# Patient Record
Sex: FEMALE | Race: WHITE | NOT HISPANIC OR LATINO | URBAN - METROPOLITAN AREA
[De-identification: names, ages, dates, MRNs, and addresses within clinical notes are randomized per-mention and may not be internally consistent; named-entity substitution may affect disease eponyms.]

---

## 2017-05-16 ENCOUNTER — OFFICE VISIT (OUTPATIENT)
Dept: NEUROLOGY | Facility: CLINIC | Age: 52
End: 2017-05-16

## 2017-05-16 VITALS — SYSTOLIC BLOOD PRESSURE: 124 MMHG | HEART RATE: 70 BPM | RESPIRATION RATE: 18 BRPM | DIASTOLIC BLOOD PRESSURE: 70 MMHG

## 2017-05-16 DIAGNOSIS — G35 MULTIPLE SCLEROSIS (H): Primary | ICD-10-CM

## 2017-05-16 DIAGNOSIS — R39.15 URINARY URGENCY: ICD-10-CM

## 2017-05-16 DIAGNOSIS — R26.9 GAIT DISTURBANCE: ICD-10-CM

## 2017-05-16 RX ORDER — OXYBUTYNIN CHLORIDE 5 MG/1
5 TABLET ORAL 2 TIMES DAILY
Qty: 60 TABLET | Refills: 5 | Status: SHIPPED | OUTPATIENT
Start: 2017-05-16

## 2017-05-16 ASSESSMENT — PAIN SCALES - GENERAL: PAINLEVEL: EXTREME PAIN (9)

## 2017-05-16 NOTE — MR AVS SNAPSHOT
After Visit Summary   5/16/2017    Lily Graham    MRN: [id number]           Patient Information     Date Of Birth          1965        Visit Information        Provider Department      5/16/2017 2:00 PM Darien Mota MD [facility]        Today's Diagnoses     Urinary urgency    -  1    Gait disturbance        Multiple sclerosis (H)          Care Instructions    1. We will try oxybutynin 5 mg twice daily for urinary urgency    2. We have placed a referral to physical therapy for evaluation of gait problems    3. We will plan on you returning to the [facility] ([address] [place] [address]) for follow up appointment in 6 months: we will contact you to arrange, but you can also call [phone]         Follow-ups after your visit        Additional Services     PHYSICAL THERAPY REFERRAL       *This therapy referral will be filtered to a centralized scheduling office at [facility] and the patient will receive a call to schedule an appointment at a [place] location most convenient for them. *     [facility] provides Physical Therapy evaluation and treatment and many specialty services across the [place] system.  If requesting a specialty program, please choose from the list below.    If you have not heard from the scheduling office within 2 business days, please call [phone] for all locations, with the exception of [place], please call [phone].  Treatment: Evaluation & Treatment  Special Instructions/Modalities:   Special Programs: None    Please be aware that coverage of these services is subject to the terms and limitations of your health insurance plan.  Call member services at your health plan with any benefit or coverage questions.      **Note to Provider:  If you are referring outside of [place] for the therapy appointment, please list the name of the location in the   special instructions  above, print the referral and give to the patient to schedule the appointment.                  Who to contact     Please call your clinic at [phone] to:    Ask questions about your health    Make or cancel appointments    Discuss your medicines    Learn about your test results    Speak to your doctor   If you have compliments or concerns about an experience at your clinic, or if you wish to file a complaint, please contact [facility] Patient Relations at [phone] or email us at [email or web address]         Additional Information About Your Visit        Bottlenose Information     Bottlenose is an electronic gateway that provides easy, online access to your medical records. With Bottlenose, you can request a clinic appointment, read your test results, renew a prescription or communicate with your care team.     To sign up for [employer] visit the website at [email or web address]   You will be asked to enter the access code listed below, as well as some personal information. Please follow the directions to create your username and password.     Your access code is: [email or web address]  Expires: 2017  3:15 PM     Your access code will  in 90 days. If you need help or a new code, please contact your [facility] or call [phone] for assistance.        Care EveryWhere ID     This is your Care EveryWhere ID. This could be used by other organizations to access your [facility] medical records  [id number]        Your Vitals Were     Pulse Respirations                70 18           Blood Pressure from Last 3 Encounters:   17 124/70   11/15/16 105/58   10/18/16 106/76    Weight from Last 3 Encounters:   No data found for Wt              We Performed the Following     PHYSICAL THERAPY REFERRAL          Today's Medication Changes          These changes are accurate as of: 17  3:15 PM.  If you have any questions, ask  your nurse or doctor.               Start taking these medicines.        Dose/Directions    oxybutynin 5 MG tablet   Commonly known as:  DITROPAN   Used for:  Urinary urgency   Started by:  Darien Mota MD        Dose:  5 mg   Take 1 tablet (5 mg) by mouth 2 times daily   Quantity:  60 tablet   Refills:  5            Where to get your medicines      These medications were sent to [facility] [address] [address]  [address] [facility] [address]     Phone:  [phone]     oxybutynin 5 MG tablet                Primary Care Provider Office Phone # Fax #    Graham Bermudez [phone]       [address] [address]        Thank you!     Thank you for choosing [facility]  for your care. Our goal is always to provide you with excellent care. Hearing back from our patients is one way we can continue to improve our services. Please take a few minutes to complete the written survey that you may receive in the mail after your visit with us. Thank you!             Your Updated Medication List - Protect others around you: Learn how to safely use, store and throw away your medicines at www.disposemymeds.org.          This list is accurate as of: 5/16/17  3:15 PM.  Always use your most recent med list.                   Brand Name Dispense Instructions for use    ADDERALL 20 MG per tablet   Generic drug:  amphetamine-dextroamphetamine      Take 20 mg by mouth 2 times daily       * ALPRAZOLAM XR 2 MG 24 hr tablet   Generic drug:  ALPRAZolam      Take 2 mg by mouth daily       * ALPRAZolam 0.5 MG tablet    XANAX     Take 0.5 mg by mouth 2 times daily       cyclobenzaprine 10 MG tablet    FLEXERIL     Take 10 mg by mouth 3 times daily as needed       * D 5000 5000 UNITS Tabs   Generic drug:  Cholecalciferol      Take 5,000 Units by mouth daily        * cholecalciferol 5000 UNITS Caps capsule    vitamin D3    30 capsule    Take 1 capsule (5,000 Units) by mouth daily       escitalopram 20 MG tablet    LEXAPRO     Take 20 mg by mouth daily       furosemide 20 MG tablet    LASIX     Take 20 mg by mouth as needed       HYDROcodone-acetaminophen  MG per tablet    NORCO     Take 2 tablets by mouth every 4 hours as needed       Morphine Sulfate 60 MG Cp24      Take 60 mg by mouth every 8 hours       oxybutynin 5 MG tablet    DITROPAN    60 tablet    Take 1 tablet (5 mg) by mouth 2 times daily       ranitidine 150 MG tablet    ZANTAC     Take 150 mg by mouth as needed       warfarin 6 MG tablet    COUMADIN     Take 6 mg by mouth daily Except M & F 3 mg       * Notice:  This list has 4 medication(s) that are the same as other medications prescribed for you. Read the directions carefully, and ask your doctor or other care provider to review them with you.

## 2017-05-16 NOTE — PATIENT INSTRUCTIONS
1. We will try oxybutynin 5 mg twice daily for urinary urgency    2. We have placed a referral to physical therapy for evaluation of gait problems    3. We will plan [facility] ([address] [place] [address]) for follow up appointment in 6 months: we will contact you to arrange, but you can also call [phone]

## 2017-05-16 NOTE — LETTER
"5/16/2017      RE: Lily Graham  [address] [address]       Referral source: Established patient     Chief complaint: Multiple sclerosis     History of the Present Illness: Ms. Lily Graham is a 51-year-old woman who returns to the [facility]clerosis Clinic today for scheduled follow-up.  Her history is as per my previous notes.  She has a longstanding history of multiple sclerosis dating back to the early 1990s, but has been off of disease-modifying therapy for greater than 10 years.  MRI imaging performed late last year demonstrated minimal change with accumulation of only a single new lesion in comparison to earlier MRI performed more than 5 years ago.  I discussed with the patient at that time that I did not see a compelling reason for her to initiate disease modifying therapy.      Today, she tells me that \"everything is getting worse and worse.\"  By this, she means that her several chronic issues with back pain, walking difficulty and urinary symptoms are continuing to progress but she has not had any new episodic changes in vision, balance, strength or sensation suggestive of new relapse of MS since she was last seen.      She indicates that she is not able to walk for as long before she has to stop to rest.  Her legs feel subjectively weak.  She did not proceed with physical therapy evaluation after her last appointment as recommended.  She says that no one ever contacted her in this regard.      She also has frequent urinary urgency and urge-associated urinary incontinence.  She tells me that she is going through multiple pads per day because of this.  She has really never had an adequate trial of any anticholinergic medication or other attempt at therapy for urge-associated urinary incontinence.      Past Medical History:  1.  Chronic pain syndrome.   2.  Recurrent deep venous thrombosis, on warfarin anticoagulation.   3.  Depression.   4.  Excessive daytime sleepiness.   5. "  Anxiety and panic symptoms.   6.  Gastroesophageal reflux disease.        PHYSICAL EXAMINATION:   VITAL SIGNS:  Blood pressure 124/70; pulse 70; respiratory rate 18 breaths per minute.   GENERAL:  Well-nourished woman who presents to the examination alone, awake and alert.  She is intermittently tearful when discussing her symptoms.   NEUROLOGIC:   MENTAL STATUS:  Alert and oriented times four.   CRANIAL NERVES:  Visual fields are full to confrontation.  Extraocular movements are intact with no internuclear ophthalmoplegia.  Facial strength is normal.  Hearing is normal for conversational purposes.  Palate elevation and tongue protrusion are normal.   POWER:  Strength is normal (5/5) in the following muscles/groups:  Deltoids, biceps, triceps, wrist extensors, finger extensors, first dorsal interosseous, hip flexors, hamstrings and anterior tibialis.   REFLEXES:  As before, reflexes are brisk at the knees bilaterally with crossed adductor signs, but otherwise symmetric and within normal limits at biceps, triceps, brachioradialis and ankles.   MOTOR/CEREBELLAR:  There are no tremors, myoclonus or other abnormal movements.  Tone is grossly normal in the limbs.  There is no appendicular ataxia on finger-to-nose testing and rapid alternating movements are normal in the extremities.  There is no pronator drift.   GAIT:  The patient is able to ambulate on a flat level surface without loss of postural stability.  She can walk on heels and toes.  Tandem gait is moderately to severely impaired for age.      Assessment/plan:    1.  Multiple sclerosis  The patient may be experiencing some gradually progressive symptoms related to underlying diagnosis of multiple sclerosis, although her examination does not show much change from October 2016.  This is also somewhat confounded by chronic low back pain, for which she is taking scheduled opiate pain medications.      At present, I am going to plan on seeing her back in clinic in  6 months and we will attempt symptomatic treatment of her various issues as discussed further below.  She stopped taking vitamin D and I encouraged her to restart this at 5000 units by mouth daily.      2.  Gait disturbance  I told the patient that my primary recommendation, as before, would be that we have her evaluated by physical therapy.  She is concerned about cost.  I told her that the intent of physical therapy evaluation is not that she attend sessions in perpetuity but that she learn some strategies to try to improve her gait stability and design a long-term maintenance exercise program.      We also spoke with the possibility of dalfampridine.  This medication is a symptomatic medication for impaired gait in multiple sclerosis.  It has been shown to improve walking speed in a percentage of patients with MS.  Primary contraindications of this drug are history of seizures and renal insufficiency, neither of which is present in Ms. Graham.  I told her that this medicine would have some potential to improve walking speed as well as anecdotally improve exercise tolerance and fatigue.  I do not think it will help any of her walking difficulty that is secondary to back pain, however.  She would prefer to hold off on new prescription in this regard at the present time, but we can continue exploring this in the future.      3.  Urge urinary incontinence  I am going to give her a trial of oxybutynin 5 mg by mouth twice daily to see if this is beneficial for reducing the frequency and severity of her episodes of urinary urgency and incontinence.  If this is not helpful in improving her symptoms, referring her back to urology may be the best next step.        I spent 40 minutes with the patient in the office today, with greater than 50% of this time spent in counseling.      Darien Mota MD   of Neurology  [facility]    Cc:  Graham Bermudez MD  (PCP)  Patient

## 2017-05-17 NOTE — PROGRESS NOTES
"Referral source: Established patient     Chief complaint: Multiple sclerosis     History of the Present Illness: Ms. Lily Graham is a 51-year-old woman who returns to the [facility]clerosis Clinic today for scheduled follow-up.  Her history is as per my previous notes.  She has a longstanding history of multiple sclerosis dating back to the early 1990s, but has been off of disease-modifying therapy for greater than 10 years.  MRI imaging performed late last year demonstrated minimal change with accumulation of only a single new lesion in comparison to earlier MRI performed more than 5 years ago.  I discussed with the patient at that time that I did not see a compelling reason for her to initiate disease modifying therapy.      Today, she tells me that \"everything is getting worse and worse.\"  By this, she means that her several chronic issues with back pain, walking difficulty and urinary symptoms are continuing to progress but she has not had any new episodic changes in vision, balance, strength or sensation suggestive of new relapse of MS since she was last seen.      She indicates that she is not able to walk for as long before she has to stop to rest.  Her legs feel subjectively weak.  She did not proceed with physical therapy evaluation after her last appointment as recommended.  She says that no one ever contacted her in this regard.      She also has frequent urinary urgency and urge-associated urinary incontinence.  She tells me that she is going through multiple pads per day because of this.  She has really never had an adequate trial of any anticholinergic medication or other attempt at therapy for urge-associated urinary incontinence.      Past Medical History:  1.  Chronic pain syndrome.   2.  Recurrent deep venous thrombosis, on warfarin anticoagulation.   3.  Depression.   4.  Excessive daytime sleepiness.   5.  Anxiety and panic symptoms.   6.  Gastroesophageal reflux disease.      PHYSICAL " EXAMINATION:   VITAL SIGNS:  Blood pressure 124/70; pulse 70; respiratory rate 18 breaths per minute.   GENERAL:  Well-nourished woman who presents to the examination alone, awake and alert.  She is intermittently tearful when discussing her symptoms.   NEUROLOGIC:   MENTAL STATUS:  Alert and oriented times four.   CRANIAL NERVES:  Visual fields are full to confrontation.  Extraocular movements are intact with no internuclear ophthalmoplegia.  Facial strength is normal.  Hearing is normal for conversational purposes.  Palate elevation and tongue protrusion are normal.   POWER:  Strength is normal (5/5) in the following muscles/groups:  Deltoids, biceps, triceps, wrist extensors, finger extensors, first dorsal interosseous, hip flexors, hamstrings and anterior tibialis.   REFLEXES:  As before, reflexes are brisk at the knees bilaterally with crossed adductor signs, but otherwise symmetric and within normal limits at biceps, triceps, brachioradialis and ankles.   MOTOR/CEREBELLAR:  There are no tremors, myoclonus or other abnormal movements.  Tone is grossly normal in the limbs.  There is no appendicular ataxia on finger-to-nose testing and rapid alternating movements are normal in the extremities.  There is no pronator drift.   GAIT:  The patient is able to ambulate on a flat level surface without loss of postural stability.  She can walk on heels and toes.  Tandem gait is moderately to severely impaired for age.      Assessment/plan:    1.  Multiple sclerosis  The patient may be experiencing some gradually progressive symptoms related to underlying diagnosis of multiple sclerosis, although her examination does not show much change from October 2016.  This is also somewhat confounded by chronic low back pain, for which she is taking scheduled opiate pain medications.      At present, I am going to plan on seeing her back in clinic in 6 months and we will attempt symptomatic treatment of her various issues as  discussed further below.  She stopped taking vitamin D and I encouraged her to restart this at 5000 units by mouth daily.      2.  Gait disturbance  I told the patient that my primary recommendation, as before, would be that we have her evaluated by physical therapy.  She is concerned about cost.  I told her that the intent of physical therapy evaluation is not that she attend sessions in perpetuity but that she learn some strategies to try to improve her gait stability and design a long-term maintenance exercise program.      We also spoke with the possibility of dalfampridine.  This medication is a symptomatic medication for impaired gait in multiple sclerosis.  It has been shown to improve walking speed in a percentage of patients with MS.  Primary contraindications of this drug are history of seizures and renal insufficiency, neither of which is present in Ms. Graham.  I told her that this medicine would have some potential to improve walking speed as well as anecdotally improve exercise tolerance and fatigue.  I do not think it will help any of her walking difficulty that is secondary to back pain, however.  She would prefer to hold off on new prescription in this regard at the present time, but we can continue exploring this in the future.      3.  Urge urinary incontinence  I am going to give her a trial of oxybutynin 5 mg by mouth twice daily to see if this is beneficial for reducing the frequency and severity of her episodes of urinary urgency and incontinence.  If this is not helpful in improving her symptoms, referring her back to urology may be the best next step.      I spent 40 minutes with the patient in the office today, with greater than 50% of this time spent in counseling.         LEDY HERNANDEZ MD             D: 2017 15:54   T: 2017 22:10   MT: nh      Name:     FRANKI GRAHAM   MRN:      [id number]        Account:      [id number]   :      1965            Service Date: 05/16/2017      Document: [id number]

## 2017-11-12 ENCOUNTER — HEALTH MAINTENANCE LETTER (OUTPATIENT)
Age: 52
End: 2017-11-12

## 2017-11-13 ENCOUNTER — DOCUMENTATION ONLY (OUTPATIENT)
Dept: NEUROLOGY | Facility: CLINIC | Age: 52
End: 2017-11-13

## 2017-12-21 ENCOUNTER — OFFICE VISIT (OUTPATIENT)
Dept: NEUROLOGY | Facility: CLINIC | Age: 52
End: 2017-12-21
Attending: PSYCHIATRY & NEUROLOGY
Payer: COMMERCIAL

## 2017-12-21 VITALS — HEART RATE: 76 BPM | DIASTOLIC BLOOD PRESSURE: 69 MMHG | HEIGHT: 65 IN | SYSTOLIC BLOOD PRESSURE: 107 MMHG

## 2017-12-21 DIAGNOSIS — R26.9 GAIT DISTURBANCE: ICD-10-CM

## 2017-12-21 DIAGNOSIS — E55.9 VITAMIN D DEFICIENCY: ICD-10-CM

## 2017-12-21 DIAGNOSIS — G35 MS (MULTIPLE SCLEROSIS) (H): Primary | ICD-10-CM

## 2017-12-21 DIAGNOSIS — N31.9 NEUROGENIC BLADDER: ICD-10-CM

## 2017-12-21 DIAGNOSIS — G89.4 CHRONIC PAIN SYNDROME: ICD-10-CM

## 2017-12-21 PROCEDURE — 36415 COLL VENOUS BLD VENIPUNCTURE: CPT | Performed by: PSYCHIATRY & NEUROLOGY

## 2017-12-21 PROCEDURE — 82306 VITAMIN D 25 HYDROXY: CPT | Performed by: PSYCHIATRY & NEUROLOGY

## 2017-12-21 ASSESSMENT — PAIN SCALES - GENERAL: PAINLEVEL: SEVERE PAIN (7)

## 2017-12-21 NOTE — PATIENT INSTRUCTIONS
1. Blood test (vitamin D) today    2. We will refer you to physical therapy, ideally through the [facility], for evaluation of gait instability and back pain    3. Please try oxybutynin 5 mg in the morning and 5 mg in the evening to start    4. Return to clinic in 6 months

## 2017-12-21 NOTE — LETTER
12/21/2017      RE: Lily Graham  [address] [facility] [address]     Referral source: Established patient     Chief complaint: Multiple sclerosis      Assisted by: Ene Stewart MD, PGY-4 neurology resident     History of the Present Illness: Ms. Lily Graham is a 52-year-old woman who returns to the [facility] Sclerosis Clinic today for regularly scheduled follow-up.  I was assisted in this evaluation today by Dr. Stewart, and her documentation should be considered integral to this note.      The patient's history is as per my previous notes.  She initially developed symptoms of multiple sclerosis approximately 25 years ago with symptoms including numbness in the hands, balance impairment and horizontal double vision.  She was diagnosed with multiple sclerosis on the basis of MRI and CSF evaluations and was treated with disease-modifying therapy with interferon beta for about 7 years, but has been off of this since 2003. Subsequently, she was also treated for a number of years with monthly pulses of methylprednisolone by an outside provider, but is now off of this as well.     At my previous appointments with the patient, we have discussed symptomatic issues with gait disturbance, chronic low back pain and urinary urgency.  I have recommended physical therapy on several occasions, but to date, the patient has not established care with a physical therapist.  The primary barrier in this regard seems to be cost.      Regarding bladder symptoms, the patient indicates that she is experiencing worsening nocturnal urinary incontinence.  She does have urgency as well as difficulty emptying during the day.  At her last appointment in May, I provided her with a prescription for oxybutynin, but she did not end up starting this as she was concerned about potential for dry mouth and exacerbation of difficulty with bladder emptying.      Regarding her chronic low back pain, the patient is currently receiving  opioid pain medications including morphine sulfate and hydrocodone tabs from an outside provider.      Past Medical History:  1.  Chronic pain syndrome.   2.  Recurrent deep venous thrombosis.   3.  Depression.   4.  Excessive daytime sleepiness.   5.  Anxiety.   6.  Gastroesophageal reflux disease.      Physical examination is as per Dr. Stewart. Neurological examination is nonfocal apart from gait instability, consistent with previous examinations.      Assessment/plan:    1.  Multiple sclerosis  The patient's neurologic examination is stable. Her ost recent MRI imaging in 2016 showed minimal change in comparison to scans performed for 5 years earlier, and we agreed that she would remain off of disease-modifying therapy at present.      We are going to check a vitamin D level today and advise the patient to adjust the dose as necessary to maintain a goal rate between 60 and 80.  I will plan on seeing her back in clinic in about 6 months.     2.  Gait disturbance  I again told the patient that I strongly recommend that she undergo evaluation by a physical therapist directed at trying to improve her gait stability as well as to attempt to address her chronic low back discomfort.  Ideally, I would like her to be [facility] in the [facility] in [place], and I have placed a referral in this regard.  The patient also mentions that she thinks she may be able to receive financial assistance in the [employer] system.  I told her that if undergoing physical therapy here is not workable for whatever reason, asking her primary care provider for a referral to [facility] would also be a reasonable next step.     3.  Neurogenic bladder  I discussed with the patient that the options, overall, include trying an anticholinergic medication, urologic referral or simply accepting her symptoms.  Her urinary symptoms do seem to be associated with significant disruption of quality of life and I would not advise the latter option, although  "this is essentially what she has done up to the present time.  After discussion, the patient agreed that she would attempt a trial of oxybutynin beginning at 5 mg twice daily as we previously discussed.  If this is associated with unacceptable side effects or worsening of bladder emptying, we can try a different medication, possibly mirabegron, or proceed with a urology referral.     4.  Chronic pain  The patient asked me about \"palliative care\" referral.  She mentioned an outside provider with whom I am not familiar.  I discussed chronic pain management within the [facility] system with the patient.  I let her know that the pain management program here is focused on functional status as a primary goal and that typical management of chronic low back pain would not include opiate pain medications.  I told her that I would strongly endorse her coming off of those drugs, and if she is willing to contemplate this, I would be happy to refer her to the pain management center in our system.      However, at present my sense was that her primary goal was to find a provider closer to her home who would be willing to provide her current pain medications, and I told her frankly that I do not think that maintenance of these doses would be advised in our center.  I told her that I would be happy to provide her             with a referral for an opinion in our system if desired, but she did not request this today after the above discussion.     Darien Mota MD   of Neurology  [facility]    Cc:  Patient  "

## 2017-12-21 NOTE — MR AVS SNAPSHOT
"              After Visit Summary   12/21/2017    Lily Graham    MRN: [id number]           Patient Information     Date Of Birth          1965        Visit Information        Provider Department      12/21/2017 1:30 PM Darien Mota MD [facility]        Today's Diagnoses     MS (multiple sclerosis) (H)    -  1    Gait disturbance        Vitamin D deficiency          Care Instructions    1. Blood test (vitamin D) today    2. We will refer you to physical therapy, ideally through the [facility] [place] [facility], for evaluation of gait instability and back pain    3. Please try oxybutynin 5 mg in the morning and 5 mg in the evening to start    4. Return to clinic in 6 months          Follow-ups after your visit        Additional Services     PT Referral ([place])       *This therapy referral will be filtered to a centralized scheduling office at [facility] and the patient will receive a call to schedule an appointment at a [place] location most convenient for them. *     [facility] provides Physical Therapy evaluation and treatment and many specialty services across the [place] system.  If requesting a specialty program, please choose from the list below.    If you have not heard from the scheduling office within 2 business days, please call [phone] for all locations, with the exception of [place], please call [phone].  Treatment: Evaluation & Treatment  Special Instructions/Modalities:   Special Programs: [facility]    Please be aware that coverage of these services is subject to the terms and limitations of your health insurance plan.  Call member services at your health plan with any benefit or coverage questions.      **Note to Provider:  If you are referring outside of [place] for the therapy appointment, please list the name of the location in the \"special instructions\" above, print the referral and give " to the patient to schedule the appointment.                  Follow-up notes from your care team     Return in about 6 months (around 6/21/2018).      Your next 10 appointments already scheduled     Dec 21, 2017  2:45 PM CST   LAB with  LAB   [facility] ([facility])    [address] [place] [address]  [address] [facility] [id number] [phone]           Please do not eat 10-12 hours before your appointment if you are coming in fasting for labs on lipids, cholesterol, or glucose (sugar). This does not apply to pregnant women. Water, hot tea and black coffee (with nothing added) are okay. Do not drink other fluids, diet soda or chew gum.            Jun 28, 2018  2:00 PM CDT   (Arrive by 1:45 PM)   Return Multiple Sclerosis with Darien Mota MD   [facility] Multiple Sclerosis ([facility])    [address] [place] [id number] [phone]              Future tests that were ordered for you today     Open Future Orders        Priority Expected Expires Ordered    Vitamin D Deficiency Screening Routine 12/21/2017 1/31/2018 12/21/2017    PT Referral ([place]) Routine 12/21/2017 12/21/2018 12/21/2017            Who to contact     If you have questions or need follow up information about today's clinic visit or your schedule please contact [facility] directly at [phone].  Normal or non-critical lab and imaging results will be communicated to you by MyChart, letter or phone within 4 business days after the clinic has received the results. If you do not hear from us within 7 days, please contact the clinic through MyChart or phone. If you have a critical or abnormal lab result, we will notify you by phone as soon as possible.  Submit refill requests through [employer] or call your pharmacy and they will forward the refill request to us. Please allow 3 business days for your refill to be completed.          Additional  "Information About Your Visit        MyChart Information     [employer] gives you secure access to your electronic health record. If you see a primary care provider, you can also send messages to your care team and make appointments. If you have questions, please call your primary care clinic.  If you do not have a primary care provider, please call [phone] and they will assist you.        Care EveryWhere ID     This is your Care EveryWhere ID. This could be used by other organizations to access your [place] medical records  [id number]        Your Vitals Were     Pulse Height                76 1.638 m (5' 4.5\")           Blood Pressure from Last 3 Encounters:   12/21/17 107/69   05/16/17 124/70   11/15/16 105/58    Weight from Last 3 Encounters:   No data found for Wt               Primary Care Provider Office Phone # Fax #    Graham Bermudez [phone]       [address] [address]        Equal Access to Services     ESTEFANÍA MOORE : Hadii aad ku hadasho Soomaali, waaxda luqadaha, qaybta kaalmada adeegyada, waxay idiin hayaan adeeg kharadusty la'gion ah. So [facility] [phone].    ATENCIÓN: Si habla español, tiene a boucher disposición servicios gratuitos de asistencia lingüística. Saira al [phone].    We comply with applicable federal civil rights laws and [place] laws. We do not discriminate on the basis of race, color, national origin, age, disability, sex, sexual orientation, or gender identity.            Thank you!     Thank you for choosing [facility]  for your care. Our goal is always to provide you with excellent care. Hearing back from our patients is one way we can continue to improve our services. Please take a few minutes to complete the written survey that you may receive in the mail after your visit with us. Thank you!             Your Updated Medication List - Protect others around you: Learn how to safely use, store and " throw away your medicines at www.disposemymeds.org.          This list is accurate as of: 12/21/17  2:40 PM.  Always use your most recent med list.                   Brand Name Dispense Instructions for use Diagnosis    ADDERALL 20 MG per tablet   Generic drug:  amphetamine-dextroamphetamine      Take 20 mg by mouth 2 times daily        * ALPRAZOLAM XR 2 MG 24 hr tablet   Generic drug:  ALPRAZolam      Take 2 mg by mouth daily        * ALPRAZolam 0.5 MG tablet    XANAX     Take 0.5 mg by mouth 2 times daily        * FLEXERIL PO           * cyclobenzaprine 10 MG tablet    FLEXERIL     Take 10 mg by mouth 3 times daily as needed        * D 5000 5000 UNITS Tabs   Generic drug:  Cholecalciferol      Take 5,000 Units by mouth daily        * cholecalciferol 5000 UNITS Caps capsule    vitamin D3    30 capsule    Take 1 capsule (5,000 Units) by mouth daily    Vitamin D deficiency       escitalopram 20 MG tablet    LEXAPRO     Take 20 mg by mouth daily        furosemide 20 MG tablet    LASIX     Take 20 mg by mouth as needed        HYDROcodone-acetaminophen  MG per tablet    NORCO     Take 2 tablets by mouth every 4 hours as needed        Morphine Sulfate 60 MG Cp24      Take 60 mg by mouth every 8 hours        oxybutynin 5 MG tablet    DITROPAN    60 tablet    Take 1 tablet (5 mg) by mouth 2 times daily    Urinary urgency       ranitidine 150 MG tablet    ZANTAC     Take 150 mg by mouth as needed        warfarin 6 MG tablet    COUMADIN     Take 6 mg by mouth daily Except M & F 3 mg        * Notice:  This list has 6 medication(s) that are the same as other medications prescribed for you. Read the directions carefully, and ask your doctor or other care provider to review them with you.

## 2017-12-21 NOTE — PROGRESS NOTES
Referral source: Established patient     Chief complaint: Multiple sclerosis      Assisted by: Ene Stewart MD, PGY-4 neurology resident     History of the Present Illness: Ms. Lily Graham is a 52-year-old woman who returns to the Multiple Sclerosis Clinic today for regularly scheduled follow-up.  I was assisted in this evaluation today by Dr. Stewart, and her documentation should be considered integral to this note.      The patient's history is as per my previous notes.  She initially developed symptoms of multiple sclerosis approximately 25 years ago with symptoms including numbness in the hands, balance impairment and horizontal double vision.  She was diagnosed with multiple sclerosis on the basis of MRI and CSF evaluations and was treated with disease-modifying therapy with interferon beta for about 7 years, but has been off of this since 2003. Subsequently, she was also treated for a number of years with monthly pulses of methylprednisolone by an outside provider, but is now off of this as well.     At my previous appointments with the patient, we have discussed symptomatic issues with gait disturbance, chronic low back pain and urinary urgency.  I have recommended physical therapy on several occasions, but to date, the patient has not established care with a physical therapist.  The primary barrier in this regard seems to be cost.      Regarding bladder symptoms, the patient indicates that she is experiencing worsening nocturnal urinary incontinence.  She does have urgency as well as difficulty emptying during the day.  At her last appointment in May, I provided her with a prescription for oxybutynin, but she did not end up starting this as she was concerned about potential for dry mouth and exacerbation of difficulty with bladder emptying.      Regarding her chronic low back pain, the patient is currently receiving opioid pain medications including morphine sulfate and hydrocodone tabs from an  outside provider.      Past Medical History:  1.  Chronic pain syndrome.   2.  Recurrent deep venous thrombosis.   3.  Depression.   4.  Excessive daytime sleepiness.   5.  Anxiety.   6.  Gastroesophageal reflux disease.      Physical examination is as per Dr. Stewart. Neurological examination is nonfocal apart from gait instability, consistent with previous examinations.      Assessment/plan:    1.  Multiple sclerosis  The patient's neurologic examination is stable. Her ost recent MRI imaging in 2016 showed minimal change in comparison to scans performed for 5 years earlier, and we agreed that she would remain off of disease-modifying therapy at present.      We are going to check a vitamin D level today and advise the patient to adjust the dose as necessary to maintain a goal rate between 60 and 80.  I will plan on seeing her back in clinic in about 6 months.     2.  Gait disturbance  I again told the patient that I strongly recommend that she undergo evaluation by a physical therapist directed at trying to improve her gait stability as well as to attempt to address her chronic low back discomfort.  Ideally, I would like her to be seen in the [facility] in [place], and I have placed a referral in this regard.  The patient also mentions that she thinks she may be able to receive financial assistance in the [employer] system.  I told her that if undergoing physical therapy here is not workable for whatever reason, asking her primary care provider for a referral to [facility] would also be a reasonable next step.     3.  Neurogenic bladder  I discussed with the patient that the options, overall, include trying an anticholinergic medication, urologic referral or simply accepting her symptoms.  Her urinary symptoms do seem to be associated with significant disruption of quality of life and I would not advise the latter option, although this is essentially what she has done up to the present time.  After discussion,  "the patient agreed that she would attempt a trial of oxybutynin beginning at 5 mg twice daily as we previously discussed.  If this is associated with unacceptable side effects or worsening of bladder emptying, we can try a different medication, possibly mirabegron, or proceed with a urology referral.     4.  Chronic pain  The patient asked me about \"palliative care\" referral.  She mentioned an outside provider with whom I am not familiar.  I discussed chronic pain management within the [facility] system with the patient.  I let her know that the pain management program here is focused on functional status as a primary goal and that typical management of chronic low back pain would not include opiate pain medications.  I told her that I would strongly endorse her coming off of those drugs, and if she is willing to contemplate this, I would be happy to refer her to the pain management center in our system.      However, at present my sense was that her primary goal was to find a provider closer to her home who would be willing to provide her current pain medications, and I told her frankly that I do not think that maintenance of these doses would be advised in our center.  I told her that I would be happy to provide her with a referral for an opinion in our system if desired, but she did not request this today after the above discussion.         LEDY HERNANDEZ MD             D: 2017 14:55   T: 2017 15:33   MT: DARRIN      Name:     FRANKI SPRAGUE   MRN:      [id number]        Account:      [id number]   :      1965           Service Date: 2017      Document: [id number]      "

## 2017-12-21 NOTE — NURSING NOTE
Chief Complaint   Patient presents with     RECHECK     Multiple Sclerosis    Sabiha Dillon CMA

## 2017-12-22 LAB — DEPRECATED CALCIDIOL+CALCIFEROL SERPL-MC: 84 UG/L (ref 20–75)

## 2017-12-22 NOTE — PROGRESS NOTES
"[facility]  Neurology Progress Note  12/21/2017    Chief Complaint:  Scheduled MS follow up    HPI: Lily Graham is a 52 year old woman who has had MS since the 1990's. She was on DMT with interferon beta for about 7 years, but has been off since 2003. She was treated with pulse steroids for some time as well. She was last seen here in May 2017.    Since her last visit, Ms. Graham continues to complain of urinary troubles. She has difficulty emptying her bladder, and now spends extra time in the bathroom to makes sure she empties all of the way. She no longer has UTIs every month, so this is improved. However, she now has urinary incontinence overnight. It is embarrassing for her. She did not try the oxybutynin that was prescribed at the last visit because one of the side effects is dry mouth, and concern for worsening inability to empty her bladder.    She continues to have back pain. She drives 230 miles once a month to a doctor who prescribes her morphine (on chart review, it looks like morphine 180 mg daily and hydrocodone 20 mg q4hrs prn). She tried to see a palliative care physician but it seems she is unsure if this person would prescribe her the same medications.     She complains of leg weakness, and that she needs to hold on to furniture in her house at times to keep from falling.  She fell three times in the summer, and another physician prescribed her a few days of steroids. She felt better for 1-2 days.  She has not had any more recent falls and does not use any assistive deices, despite her perceived weakness.  She has not gone to physical therapy because she cannot afford the copay.    Objective:  /69  Pulse 76  Ht 1.638 m (5' 4.5\")   Physical Exam:   General:  NAD  Neurologic:     Mental Status Exam: Alert, awake. Appears somewhat distant/absentminded    Cranial Nerves: PERRL, EOMs intact, no nystagmus, facial movements symmetric, facial " sensation intact to light touch, hearing intact to conversation, no dysarthria, symmetric shoulder shrug.    Motor: 5/5 strength bilaterally in shoulder abduction, elbow flexors, wrist extension, finger extension, finger abduction, hip flexors, knee extensors, knee flexors.    Sensory: Sensation intact to light touch on arms and legs bilaterally.     Cerebellar: Finger-nose-finger intact bilaterally. Finger tapping symmetric.   Reflexes: Brisk and symmetric biceps, brachioradialis, patellar, Achilles.   Gait: slightly unstable casual gait. Able to walk on toes and heels. Sways on Romberg.      No recent labs. Last MRI was in Nov 2016.    Assessment/Plan: Lily Graham is a 52 year old woman with MS, no longer on DMT and without clinical relapses. Her MRI scans have been fairly stable, with only minimal change on her 2016 MRI compared to one about 5 years prior. She does not need to be on disease modifying treatment. She is taking vitamin D daily (unclear dose) and we can check her vitamin D level.   For her gait impairment, we again can prescribe physical therapy. She can see if this would be covered with her insurance.  She continues to have urinary incontinence due to neurogenic bladder, and we recommended again that she at least try oxybutynin. If her retention worsens or if the side effect of dry mouth is intolerable, we can try another option. She can also see urology, but we will start conservatively now. She has seen a urologist in the past already, who had discussed botox.     Regarding her inquiry about a palliative care referral, we discussed that is is unlikely that anyone in palliative care here would continue to prescribe her high doses of narcotics for back pain. Instead she could be given alternative therapies for pain management. However, her primary goal seemed to be to find a provider closer to home who will prescribe the high doses of narcotics that she receives.     She can follow up in 6  months.    The patient was seen and discussed with Dr. Mota. All questions answered. The patient is in agreement with the plan.    Ene tSewart MD   PGY-4 Neurology     Attending physician: I saw and evaluated the patient with the resident and I agree with her findings and plan of care as documented above. Please see my note for additional details.    Darien Mota MD

## 2017-12-26 ENCOUNTER — MYC MEDICAL ADVICE (OUTPATIENT)
Dept: NEUROLOGY | Facility: CLINIC | Age: 52
End: 2017-12-26

## 2018-01-05 DIAGNOSIS — E55.9 VITAMIN D DEFICIENCY: ICD-10-CM

## 2018-01-08 NOTE — TELEPHONE ENCOUNTER
Received refill request for Vitamin D from [facility]; Patient was last seen in December and has follow up appointment in June with Dr. Mota. Refilled for 1 year per MS refill protocol.    Afia Robb RN Care Coordinator

## 2018-01-26 ENCOUNTER — HOSPITAL ENCOUNTER (OUTPATIENT)
Dept: PHYSICAL THERAPY | Facility: CLINIC | Age: 53
Setting detail: THERAPIES SERIES
End: 2018-01-26
Attending: PSYCHIATRY & NEUROLOGY
Payer: COMMERCIAL

## 2018-01-26 DIAGNOSIS — R26.9 GAIT DISTURBANCE: ICD-10-CM

## 2018-01-26 DIAGNOSIS — G35 MS (MULTIPLE SCLEROSIS) (H): ICD-10-CM

## 2018-01-26 PROCEDURE — G8979 MOBILITY GOAL STATUS: HCPCS | Mod: GP,CJ | Performed by: PHYSICAL THERAPIST

## 2018-01-26 PROCEDURE — G8978 MOBILITY CURRENT STATUS: HCPCS | Mod: GP,CK | Performed by: PHYSICAL THERAPIST

## 2018-01-26 PROCEDURE — 97162 PT EVAL MOD COMPLEX 30 MIN: CPT | Mod: GP | Performed by: PHYSICAL THERAPIST

## 2018-01-26 PROCEDURE — 97116 GAIT TRAINING THERAPY: CPT | Mod: GP | Performed by: PHYSICAL THERAPIST

## 2018-01-26 PROCEDURE — 40000719 ZZHC STATISTIC PT DEPARTMENT NEURO VISIT: Performed by: PHYSICAL THERAPIST

## 2018-01-29 NOTE — ADDENDUM NOTE
Encounter addended by: Leonor Ponce PT on: 1/29/2018  4:01 PM<BR>     Actions taken: Sign clinical note, Flowsheet accepted

## 2018-01-29 NOTE — PROGRESS NOTES
01/26/18 1500   Quick Adds   Type of Visit Initial OP PT Evaluation   General Information   Start of Care Date 01/26/18   Referring Physician Dr. Darien Diaz    Orders Evaluate and Treat as Indicated   Order Date 12/21/18   Medical Diagnosis multiple sclerosis; gait disturbance    Onset of illness/injury or Date of Surgery 12/21/18   Surgical/Medical history reviewed Yes   Pertinent history of current problem (include personal factors and/or comorbidities that impact the POC) Patient with chronic back pain- taking medications for this; narcotics, she has been taking these for ~20 years in total; always been on her feet when she was working, so wondering if this is an issues and was Dx'd ~25 years ago with MS. Had MRIs in the past few years, has arthritis in her back. Pain is mostly in low back-- sciatic nerve pain too, started in L but now has been worse on the R side. Has fallen a few times this last year- knees give out, feel tired/weak sometimes but depends on what she is doing. Finds uneven surfaces to be challenging. She has used walker outside before to help her balance otherwise does not use AD. Trouble getting up from ground. She has never done a formal PT program regarding her MS. Feels she is getting weaker and wants to exercise more.    Pertinent Visual History  had double vision as first sign of MS    Prior level of function comment not currently doing any exercise    Patient role/Employment history Disabled   Living environment Apartment/condo   Current Assistive Devices Front Wheeled Walker   Assistive Devices Comments has 4WW, has used in the grass/summer time outside but otherwise walks without AD    Patient/Family Goals Statement strengthening of LLE, decrease her back pain and improve her endurance    Fall Risk Screen   Fall screen completed by PT   Have you fallen 2 or more times in the past year? Yes   Have you fallen and had an injury in the past year? Yes  (bruises, hit her head )    Timed Up and Go score (seconds) 13.41   Is patient a fall risk? Yes   Fall screen comments decreased L foot clearance, hx of falls    Pain   Pain comments low back pain that is mostly constant, does get some shooting into L and R leg at times    Cognitive Status Examination   Orientation orientation to person, place and time   Level of Consciousness alert   Follows Commands and Answers Questions 100% of the time   Personal Safety and Judgment intact   Memory intact   Cognitive Comment memory appears intact but is tangential with her thoughts; difficutly keeping her hx chronological    Integumentary   Integumentary Comments mild swelling in B LEs- has compression garments but does not wear frequently; bruising and redness on LEs in distal portion    Posture   Posture Comments forward shoulder posture and kyphosis    Range of Motion (ROM)   ROM Comment B heel cord tightness L > R, HS tightness B and L hip extensor tightness mild    Strength   Strength Comments 5/5 on R side with exception of 4/5 hip flexor strength; 4+/5 L DF, 3+/5 hip flexors, 4/5 hip extensors    Bed Mobility   Bed Mobility Comments independent but moves through slowly    Transfer Skills   Transfer Comments sit<>stand with use of UEs on chair for stability and to increase her independence    Gait   Gait Comments ambulates with decreased L foot clearance and hip/knee flexion (keeps extended) with mild L hip circumduction to clear this leg, slower gait speed and decreased step length    Gait Special Tests   Gait Special Tests 25 FOOT TIMED WALK   Gait Special Tests 25 Foot Timed Walk   Seconds 9.53   Steps 17 Steps   Comments no AD    Balance   Balance Comments fair balance- sitting is stable, in standing has more difficutly with narrow CATHERINE with keeping steady; time did not allow formal balance test today    Muscle Tone   Muscle Tone Comments clonus at B ankles, increased tone in LLE at ankle, knee and hip    Planned Therapy Interventions   Planned  Therapy Interventions IADL retraining;balance training;gait training;neuromuscular re-education;ROM;strengthening;stretching;transfer training   Clinical Impression   Criteria for Skilled Therapeutic Interventions Met yes, treatment indicated   PT Diagnosis weakness and gait abnormality    Influenced by the following impairments decreased ROM and strength, tone, gait abnormality, balance impairments, decreased activity tolerance    Functional limitations due to impairments decreased safety with mobility and at increased fall risk    Clinical Presentation Evolving/Changing   Clinical Presentation Rationale PT impairments, co mobidities, fall hx, changing fatigue levels, clinical judgement    Clinical Decision Making (Complexity) Moderate complexity   Therapy Frequency 1 time/week   Predicted Duration of Therapy Intervention (days/wks) up to 90 days   Risk & Benefits of therapy have been explained Yes   Patient, Family & other staff in agreement with plan of care Yes   Clinical Impression Comments Patient with MS and chronic back pain as well as gait instability- will benefit from PT to address to improve her safety and independence with mobility.    GOALS   PT Eval Goals 1;3;2;4   Goal 1   Goal Identifier gait speed    Goal Description Patient will improve her gait speed to <8.5 secs for 25 foot walk test with < 15 steps with improved foot clearance in order to safely cross busy street and decreas her risk of falls.    Target Date 04/25/18   Goal 2   Goal Identifier FGA   Goal Description Patient will score a 20 or > on FGA in order to show improvement in balance and scoring above fall risk cut off.    Target Date 04/25/18   Goal 3   Goal Identifier sit<>stand   Goal Description Patient will perform 5 sit<>stands in 30 secs without UE in order to show improvement in strength and balance to decrease fall risk and improve ease of mobility.    Target Date 04/25/18   Goal 4   Goal Identifier back pain    Goal Description  Patient will state 5 strategie and/or stretches to decrease her back pain in order to move around with less discomfort.    Target Date 04/25/18   Total Evaluation Time   Total Evaluation Time (Minutes) 35

## 2018-02-16 ENCOUNTER — HOSPITAL ENCOUNTER (OUTPATIENT)
Dept: PHYSICAL THERAPY | Facility: CLINIC | Age: 53
Setting detail: THERAPIES SERIES
End: 2018-02-16
Attending: PSYCHIATRY & NEUROLOGY
Payer: COMMERCIAL

## 2018-02-16 PROCEDURE — 40000719 ZZHC STATISTIC PT DEPARTMENT NEURO VISIT: Performed by: PHYSICAL THERAPIST

## 2018-02-16 PROCEDURE — 97110 THERAPEUTIC EXERCISES: CPT | Mod: GP | Performed by: PHYSICAL THERAPIST

## 2018-02-20 NOTE — ADDENDUM NOTE
Encounter addended by: Leonor Ponce PT on: 2/20/2018  1:12 PM<BR>     Actions taken: Flowsheet accepted, Charge Capture section accepted

## 2018-03-09 ENCOUNTER — HOSPITAL ENCOUNTER (OUTPATIENT)
Dept: PHYSICAL THERAPY | Facility: CLINIC | Age: 53
Setting detail: THERAPIES SERIES
End: 2018-03-09
Attending: PSYCHIATRY & NEUROLOGY
Payer: COMMERCIAL

## 2018-03-09 PROCEDURE — 40000188 ZZHC STATISTIC PT OP PEDS VISIT: Performed by: PHYSICAL THERAPIST

## 2018-03-09 PROCEDURE — 97116 GAIT TRAINING THERAPY: CPT | Mod: GP | Performed by: PHYSICAL THERAPIST

## 2018-03-09 PROCEDURE — 97530 THERAPEUTIC ACTIVITIES: CPT | Mod: GP | Performed by: PHYSICAL THERAPIST

## 2018-03-09 PROCEDURE — 97110 THERAPEUTIC EXERCISES: CPT | Mod: GP | Performed by: PHYSICAL THERAPIST

## 2018-03-30 ENCOUNTER — HOSPITAL ENCOUNTER (OUTPATIENT)
Dept: PHYSICAL THERAPY | Facility: CLINIC | Age: 53
Setting detail: THERAPIES SERIES
End: 2018-03-30
Attending: PSYCHIATRY & NEUROLOGY
Payer: COMMERCIAL

## 2018-03-30 PROCEDURE — 97116 GAIT TRAINING THERAPY: CPT | Mod: GP | Performed by: PHYSICAL THERAPIST

## 2018-03-30 PROCEDURE — 97110 THERAPEUTIC EXERCISES: CPT | Mod: GP | Performed by: PHYSICAL THERAPIST

## 2018-03-30 PROCEDURE — 40000719 ZZHC STATISTIC PT DEPARTMENT NEURO VISIT: Performed by: PHYSICAL THERAPIST

## 2018-07-02 ENCOUNTER — DOCUMENTATION ONLY (OUTPATIENT)
Dept: NEUROLOGY | Facility: CLINIC | Age: 53
End: 2018-07-02

## 2019-05-03 ENCOUNTER — RECORDS - HEALTHEAST (OUTPATIENT)
Dept: LAB | Facility: CLINIC | Age: 54
End: 2019-05-03

## 2019-05-03 LAB
ALBUMIN SERPL-MCNC: 3.8 G/DL (ref 3.5–5)
ALP SERPL-CCNC: 77 U/L (ref 45–120)
ALT SERPL W P-5'-P-CCNC: 17 U/L (ref 0–45)
ANION GAP SERPL CALCULATED.3IONS-SCNC: 9 MMOL/L (ref 5–18)
AST SERPL W P-5'-P-CCNC: 18 U/L (ref 0–40)
BILIRUB SERPL-MCNC: 0.4 MG/DL (ref 0–1)
BUN SERPL-MCNC: 16 MG/DL (ref 8–22)
CALCIUM SERPL-MCNC: 9.6 MG/DL (ref 8.5–10.5)
CHLORIDE BLD-SCNC: 105 MMOL/L (ref 98–107)
CHOLEST SERPL-MCNC: 255 MG/DL
CO2 SERPL-SCNC: 27 MMOL/L (ref 22–31)
CREAT SERPL-MCNC: 0.67 MG/DL (ref 0.6–1.1)
FASTING STATUS PATIENT QL REPORTED: ABNORMAL
GFR SERPL CREATININE-BSD FRML MDRD: >60 ML/MIN/1.73M2
GLUCOSE BLD-MCNC: 70 MG/DL (ref 70–125)
HDLC SERPL-MCNC: 50 MG/DL
LDLC SERPL CALC-MCNC: 176 MG/DL
POTASSIUM BLD-SCNC: 4.6 MMOL/L (ref 3.5–5)
PROT SERPL-MCNC: 6.1 G/DL (ref 6–8)
SODIUM SERPL-SCNC: 141 MMOL/L (ref 136–145)
TRIGL SERPL-MCNC: 145 MG/DL

## 2019-05-06 LAB
25(OH)D3 SERPL-MCNC: 65.1 NG/ML (ref 30–80)
HCV AB SERPL QL IA: NEGATIVE

## 2020-03-01 ENCOUNTER — HEALTH MAINTENANCE LETTER (OUTPATIENT)
Age: 55
End: 2020-03-01

## 2020-12-14 ENCOUNTER — HEALTH MAINTENANCE LETTER (OUTPATIENT)
Age: 55
End: 2020-12-14

## 2021-04-18 ENCOUNTER — HEALTH MAINTENANCE LETTER (OUTPATIENT)
Age: 56
End: 2021-04-18

## 2021-05-30 ENCOUNTER — RECORDS - HEALTHEAST (OUTPATIENT)
Dept: ADMINISTRATIVE | Facility: CLINIC | Age: 56
End: 2021-05-30

## 2021-10-02 ENCOUNTER — HEALTH MAINTENANCE LETTER (OUTPATIENT)
Age: 56
End: 2021-10-02

## 2022-03-19 ENCOUNTER — HEALTH MAINTENANCE LETTER (OUTPATIENT)
Age: 57
End: 2022-03-19

## 2022-05-14 ENCOUNTER — HEALTH MAINTENANCE LETTER (OUTPATIENT)
Age: 57
End: 2022-05-14

## 2022-09-03 ENCOUNTER — HEALTH MAINTENANCE LETTER (OUTPATIENT)
Age: 57
End: 2022-09-03

## 2023-06-03 ENCOUNTER — HEALTH MAINTENANCE LETTER (OUTPATIENT)
Age: 58
End: 2023-06-03

## 2023-08-01 ENCOUNTER — TRANSFERRED RECORDS (OUTPATIENT)
Dept: HEALTH INFORMATION MANAGEMENT | Facility: CLINIC | Age: 58
End: 2023-08-01
Payer: MEDICAID